# Patient Record
Sex: MALE
[De-identification: names, ages, dates, MRNs, and addresses within clinical notes are randomized per-mention and may not be internally consistent; named-entity substitution may affect disease eponyms.]

---

## 2024-07-06 ENCOUNTER — RX ONLY (RX ONLY)
Age: 74
End: 2024-07-06

## 2024-08-19 ENCOUNTER — OFFICE VISIT (OUTPATIENT)
Dept: URBAN - METROPOLITAN AREA CLINIC 88 | Facility: CLINIC | Age: 74
End: 2024-08-19
Payer: COMMERCIAL

## 2024-08-19 ENCOUNTER — DASHBOARD ENCOUNTERS (OUTPATIENT)
Age: 74
End: 2024-08-19

## 2024-08-19 ENCOUNTER — LAB OUTSIDE AN ENCOUNTER (OUTPATIENT)
Dept: URBAN - METROPOLITAN AREA CLINIC 88 | Facility: CLINIC | Age: 74
End: 2024-08-19

## 2024-08-19 VITALS
SYSTOLIC BLOOD PRESSURE: 102 MMHG | DIASTOLIC BLOOD PRESSURE: 66 MMHG | HEART RATE: 90 BPM | WEIGHT: 241.3 LBS | HEIGHT: 73 IN | TEMPERATURE: 98.4 F | BODY MASS INDEX: 31.98 KG/M2

## 2024-08-19 DIAGNOSIS — Z12.11 ENCOUNTER FOR SCREENING COLONOSCOPY: ICD-10-CM

## 2024-08-19 DIAGNOSIS — Z86.73 HISTORY OF CVA (CEREBROVASCULAR ACCIDENT): ICD-10-CM

## 2024-08-19 DIAGNOSIS — R13.12 OROPHARYNGEAL DYSPHAGIA: ICD-10-CM

## 2024-08-19 PROBLEM — 71457002: Status: ACTIVE | Noted: 2024-08-19

## 2024-08-19 PROCEDURE — 99203 OFFICE O/P NEW LOW 30 MIN: CPT | Performed by: NURSE PRACTITIONER

## 2024-08-19 RX ORDER — DOXAZOSIN 4 MG/1
TAKE 1 TABLET BY MOUTH ONCE DAILY TABLET ORAL
Qty: 90 EACH | Refills: 0 | Status: ACTIVE | COMMUNITY

## 2024-08-19 RX ORDER — NIFEDIPINE 90 MG/1
TAKE 1 TABLET BY MOUTH ONCE DAILY TABLET, FILM COATED, EXTENDED RELEASE ORAL
Qty: 30 EACH | Refills: 0 | Status: ACTIVE | COMMUNITY

## 2024-08-19 RX ORDER — CITALOPRAM HYDROBROMIDE 10 MG/1
TAKE 1 TABLET BY MOUTH ONCE DAILY TABLET ORAL
Qty: 90 EACH | Refills: 0 | Status: ACTIVE | COMMUNITY

## 2024-08-19 RX ORDER — ORAL SEMAGLUTIDE 7 MG/1
TAKE 1 TABLET BY MOUTH IN THE MORNING 30 MINUTES BEFORE ANY FOOD , DRINK, OR MEDICATION WITH NO MORE THAN 4 OZ OF PLAIN WATER TABLET ORAL
Qty: 30 EACH | Refills: 0 | Status: ACTIVE | COMMUNITY

## 2024-08-19 RX ORDER — ASPIRIN 81 MG/1
TAKE 1 TABLET BY MOUTH ONCE DAILY TABLET ORAL
Qty: 30 EACH | Refills: 0 | Status: ACTIVE | COMMUNITY

## 2024-08-19 RX ORDER — AMITRIPTYLINE HYDROCHLORIDE 50 MG/1
TAKE 1 TABLET BY MOUTH ONCE DAILY AT BEDTIME TABLET, FILM COATED ORAL
Qty: 90 EACH | Refills: 0 | Status: ACTIVE | COMMUNITY

## 2024-08-19 RX ORDER — LOSARTAN POTASSIUM 50 MG/1
TAKE 1 TABLET BY MOUTH AT BEDTIME TABLET, FILM COATED ORAL
Qty: 30 EACH | Refills: 0 | Status: ACTIVE | COMMUNITY

## 2024-08-19 RX ORDER — PRAVASTATIN SODIUM 40 MG/1
TAKE 1 TABLET BY MOUTH ONCE DAILY FOR CHOLESTEROL TABLET ORAL
Qty: 90 EACH | Refills: 0 | Status: ACTIVE | COMMUNITY

## 2024-08-19 RX ORDER — APIXABAN 5 MG/1
TAKE 1 TABLET BY MOUTH TWICE DAILY TABLET, FILM COATED ORAL
Qty: 60 EACH | Refills: 0 | Status: ACTIVE | COMMUNITY

## 2024-08-19 NOTE — HPI-TODAY'S VISIT:
74 y.o. referred by Jenna Monson NP for evaluation of c/o dysphagia.  His daughter is also present at bedside.  He has a PMH of DM2, HTN, Atrial fib (on Eliquis), HLD, chronic low back pain, CVA with left side hemiparesis.  Daughter states stroke occurred on June 25, 2026 while living in [place].  Daughter states that patient was evaluated by speech therapy during his hospitalization.  However, testing was not completed prior to him moving to [place].  He  is currently receiving physical and occupation therapy at home.     Family observes patient having difficulty swallowing solids.  Daughter states she observes patient holding food in his mouth and pausing after eating.  They have also observed intermittent episodes of coughing after eating.  These episodes rarely occur and lasts for a few minutes before resolving.  Patient and daughter deny history of reflux, esophageal dilatation, or prior EGD procedure.  Rybelsus was started in June 2023 and family reports a weight loss of 40 lbs since this time.   Last colonoscopy was over 10 years ago.

## 2024-09-30 ENCOUNTER — OFFICE VISIT (OUTPATIENT)
Dept: URBAN - METROPOLITAN AREA CLINIC 88 | Facility: CLINIC | Age: 74
End: 2024-09-30

## 2024-09-30 RX ORDER — CITALOPRAM HYDROBROMIDE 10 MG/1
TAKE 1 TABLET BY MOUTH ONCE DAILY TABLET ORAL
Qty: 90 EACH | Refills: 0 | Status: ACTIVE | COMMUNITY

## 2024-09-30 RX ORDER — LOSARTAN POTASSIUM 50 MG/1
TAKE 1 TABLET BY MOUTH AT BEDTIME TABLET, FILM COATED ORAL
Qty: 30 EACH | Refills: 0 | Status: ACTIVE | COMMUNITY

## 2024-09-30 RX ORDER — ASPIRIN 81 MG/1
TAKE 1 TABLET BY MOUTH ONCE DAILY TABLET ORAL
Qty: 30 EACH | Refills: 0 | Status: ACTIVE | COMMUNITY

## 2024-09-30 RX ORDER — AMITRIPTYLINE HYDROCHLORIDE 50 MG/1
TAKE 1 TABLET BY MOUTH ONCE DAILY AT BEDTIME TABLET, FILM COATED ORAL
Qty: 90 EACH | Refills: 0 | Status: ACTIVE | COMMUNITY

## 2024-09-30 RX ORDER — DOXAZOSIN 4 MG/1
TAKE 1 TABLET BY MOUTH ONCE DAILY TABLET ORAL
Qty: 90 EACH | Refills: 0 | Status: ACTIVE | COMMUNITY

## 2024-09-30 RX ORDER — ORAL SEMAGLUTIDE 7 MG/1
TAKE 1 TABLET BY MOUTH IN THE MORNING 30 MINUTES BEFORE ANY FOOD , DRINK, OR MEDICATION WITH NO MORE THAN 4 OZ OF PLAIN WATER TABLET ORAL
Qty: 30 EACH | Refills: 0 | Status: ACTIVE | COMMUNITY

## 2024-09-30 RX ORDER — PRAVASTATIN SODIUM 40 MG/1
TAKE 1 TABLET BY MOUTH ONCE DAILY FOR CHOLESTEROL TABLET ORAL
Qty: 90 EACH | Refills: 0 | Status: ACTIVE | COMMUNITY

## 2024-09-30 RX ORDER — NIFEDIPINE 90 MG/1
TAKE 1 TABLET BY MOUTH ONCE DAILY TABLET, FILM COATED, EXTENDED RELEASE ORAL
Qty: 30 EACH | Refills: 0 | Status: ACTIVE | COMMUNITY

## 2024-09-30 RX ORDER — APIXABAN 5 MG/1
TAKE 1 TABLET BY MOUTH TWICE DAILY TABLET, FILM COATED ORAL
Qty: 60 EACH | Refills: 0 | Status: ACTIVE | COMMUNITY

## 2024-10-03 ENCOUNTER — OFFICE VISIT (OUTPATIENT)
Dept: URBAN - METROPOLITAN AREA CLINIC 88 | Facility: CLINIC | Age: 74
End: 2024-10-03
Payer: MEDICARE

## 2024-10-03 VITALS
OXYGEN SATURATION: 99 % | HEART RATE: 89 BPM | WEIGHT: 242 LBS | TEMPERATURE: 98.2 F | SYSTOLIC BLOOD PRESSURE: 90 MMHG | DIASTOLIC BLOOD PRESSURE: 50 MMHG | BODY MASS INDEX: 32.07 KG/M2 | HEIGHT: 73 IN

## 2024-10-03 DIAGNOSIS — R13.11 DYSPHAGIA, ORAL PHASE: ICD-10-CM

## 2024-10-03 DIAGNOSIS — Z12.11 ENCOUNTER FOR SCREENING COLONOSCOPY: ICD-10-CM

## 2024-10-03 DIAGNOSIS — Z86.73 HISTORY OF CVA (CEREBROVASCULAR ACCIDENT): ICD-10-CM

## 2024-10-03 PROBLEM — 429975007: Status: ACTIVE | Noted: 2024-10-03

## 2024-10-03 PROCEDURE — 99213 OFFICE O/P EST LOW 20 MIN: CPT | Performed by: NURSE PRACTITIONER

## 2024-10-03 RX ORDER — LOSARTAN POTASSIUM 50 MG/1
TAKE 1 TABLET BY MOUTH AT BEDTIME TABLET, FILM COATED ORAL
Qty: 30 EACH | Refills: 0 | Status: ACTIVE | COMMUNITY

## 2024-10-03 RX ORDER — AMITRIPTYLINE HYDROCHLORIDE 50 MG/1
TAKE 1 TABLET BY MOUTH ONCE DAILY AT BEDTIME TABLET, FILM COATED ORAL
Qty: 90 EACH | Refills: 0 | Status: ACTIVE | COMMUNITY

## 2024-10-03 RX ORDER — PRAVASTATIN SODIUM 40 MG/1
TAKE 1 TABLET BY MOUTH ONCE DAILY FOR CHOLESTEROL TABLET ORAL
Qty: 90 EACH | Refills: 0 | Status: ACTIVE | COMMUNITY

## 2024-10-03 RX ORDER — ASPIRIN 81 MG/1
TAKE 1 TABLET BY MOUTH ONCE DAILY TABLET ORAL
Qty: 30 EACH | Refills: 0 | Status: ACTIVE | COMMUNITY

## 2024-10-03 RX ORDER — DOXAZOSIN 4 MG/1
TAKE 1 TABLET BY MOUTH ONCE DAILY TABLET ORAL
Qty: 90 EACH | Refills: 0 | Status: ACTIVE | COMMUNITY

## 2024-10-03 RX ORDER — NIFEDIPINE 60 MG/1
1 TABLET TABLET, EXTENDED RELEASE ORAL ONCE A DAY
Qty: 30 TABLET | Refills: 0 | Status: ACTIVE | COMMUNITY

## 2024-10-03 RX ORDER — ORAL SEMAGLUTIDE 7 MG/1
TAKE 2 TABLETS BY MOUTH TABLET ORAL ONCE A DAY
Refills: 0 | Status: ACTIVE | COMMUNITY

## 2024-10-03 RX ORDER — CITALOPRAM HYDROBROMIDE 10 MG/1
TAKE 1 TABLET BY MOUTH ONCE DAILY TABLET ORAL
Qty: 90 EACH | Refills: 0 | Status: ACTIVE | COMMUNITY

## 2024-10-03 RX ORDER — APIXABAN 5 MG/1
TAKE 1 TABLET BY MOUTH TWICE DAILY TABLET, FILM COATED ORAL
Qty: 60 EACH | Refills: 0 | Status: ACTIVE | COMMUNITY

## 2024-10-03 NOTE — HPI-TODAY'S VISIT:
Patient presents today, along with his daughter, for evaluation of dysphagia.  He has a PMH of DM2, HTN, Atrial fib (on Eliquis and ASA), HLD, chronic low back pain, CVA with left side hemiparesis. Daughter states stroke occurred on June 25, 2026 while living in [place]. He relocated to the area to live with his daughter in June/July 2024.    Underwent Modified Barium Swallow (MBS) on 08/21/2024 at [facility].  Findings:  moderate oral dysphagia with disorganized mastification and bolus formation with reduced initiation of a-p transit.  No signs of aspiration was observed.  A minced and moist food diet with thin liquids was recommended, along with home health SLP services.   Daughter states that he sustained a mini-TIA 2-3 weeks ago.  He is scheduled for his first consultation visit with neurology later this month.

## 2024-10-03 NOTE — HPI-OTHER HISTORIES
- - - - - - - - - - - - - - - - - - - - - - - - - - Office note 08/19/2024: 74 y.o. referred by Jenna Monson NP for evaluation of c/o dysphagia. His daughter is also present at bedside. He has a PMH of DM2, HTN, Atrial fib (on Eliquis), HLD, chronic low back pain, CVA with left side hemiparesis. Daughter states stroke occurred on June 25, 2026 while living in [place]. Daughter states that patient was evaluated by speech therapy during his hospitalization. However, testing was not completed prior to him moving to [place]. He is currently receiving physical and occupation therapy at home.  Family observes patient having difficulty swallowing solids. Daughter states she observes patient holding food in his mouth and pausing after eating. They have also observed intermittent episodes of coughing after eating. These episodes rarely occur and lasts for a few minutes before resolving. Patient and daughter deny history of reflux, esophageal dilatation, or prior EGD procedure.  Rybelsus was started in June 2023 and family reports a weight loss of 40 lbs since this time. Last colonoscopy was over 10 years ago.

## 2024-12-06 ENCOUNTER — RX ONLY (RX ONLY)
Age: 74
End: 2024-12-06

## 2024-12-06 ENCOUNTER — APPOINTMENT (OUTPATIENT)
Dept: URBAN - METROPOLITAN AREA CLINIC 33 | Facility: CLINIC | Age: 74
Setting detail: DERMATOLOGY
End: 2024-12-06

## 2024-12-06 DIAGNOSIS — L85.3 XEROSIS CUTIS: ICD-10-CM

## 2024-12-06 PROBLEM — L30.9 DERMATITIS, UNSPECIFIED: Status: ACTIVE | Noted: 2024-12-06

## 2024-12-06 PROCEDURE — ? ORDER TESTS

## 2024-12-06 PROCEDURE — ? BIOPSY BY PUNCH METHOD

## 2024-12-06 PROCEDURE — 11104 PUNCH BX SKIN SINGLE LESION: CPT

## 2024-12-06 RX ORDER — TRIAMCINOLONE ACETONIDE 1 MG/G
OINTMENT TOPICAL
Qty: 30 | Refills: 2 | Status: ERX | COMMUNITY
Start: 2024-12-06

## 2024-12-06 ASSESSMENT — LOCATION ZONE DERM: LOCATION ZONE: LEG

## 2024-12-06 ASSESSMENT — LOCATION SIMPLE DESCRIPTION DERM: LOCATION SIMPLE: LEFT PRETIBIAL REGION

## 2024-12-06 ASSESSMENT — LOCATION DETAILED DESCRIPTION DERM: LOCATION DETAILED: LEFT PROXIMAL PRETIBIAL REGION

## 2024-12-06 NOTE — HPI: RASH
Is This A New Presentation, Or A Follow-Up?: Referral for Rash
Additional History: Patient is here today concerning an itchy and painful rash on left lower legs that has been present for a little over a month. Patient was seen by his PCP and was prescribed bactrim and triamcinolone cream. Patient started treatment a few days ago therefore his rash has not improved.\\n\\nItch score: 8/10

## 2024-12-06 NOTE — PROCEDURE: BIOPSY BY PUNCH METHOD

## 2024-12-06 NOTE — PROCEDURE: ORDER TESTS
Bill For Surgical Tray: no
Lab Facility: [id number]
Expected Date Of Service: 12/06/2024
Performing Laboratory: [id number]
Billing Type: Third-Party Bill

## 2025-01-02 ENCOUNTER — OFFICE VISIT (OUTPATIENT)
Dept: URBAN - METROPOLITAN AREA CLINIC 88 | Facility: CLINIC | Age: 75
End: 2025-01-02